# Patient Record
Sex: MALE | Race: WHITE | NOT HISPANIC OR LATINO | Employment: OTHER | URBAN - METROPOLITAN AREA
[De-identification: names, ages, dates, MRNs, and addresses within clinical notes are randomized per-mention and may not be internally consistent; named-entity substitution may affect disease eponyms.]

---

## 2020-11-20 PROBLEM — Z86.010 HISTORY OF COLON POLYPS: Status: ACTIVE | Noted: 2020-11-20

## 2020-11-20 PROBLEM — Z86.0100 HISTORY OF COLON POLYPS: Status: ACTIVE | Noted: 2020-11-20

## 2020-12-03 ENCOUNTER — TELEPHONE (OUTPATIENT)
Dept: ADMINISTRATIVE | Facility: CLINIC | Age: 70
End: 2020-12-03

## 2020-12-04 ENCOUNTER — TELEPHONE (OUTPATIENT)
Dept: ADMINISTRATIVE | Facility: CLINIC | Age: 70
End: 2020-12-04

## 2024-05-22 DIAGNOSIS — R06.02 SOB (SHORTNESS OF BREATH): Primary | ICD-10-CM

## 2024-05-23 DIAGNOSIS — R06.02 SHORTNESS OF BREATH: Primary | ICD-10-CM

## 2024-06-21 DIAGNOSIS — D63.1 ANEMIA IN CHRONIC KIDNEY DISEASE, UNSPECIFIED CKD STAGE: Primary | ICD-10-CM

## 2024-06-21 DIAGNOSIS — N18.9 ANEMIA IN CHRONIC KIDNEY DISEASE, UNSPECIFIED CKD STAGE: Primary | ICD-10-CM

## 2025-07-10 PROBLEM — E79.0 HYPERURICEMIA: Status: ACTIVE | Noted: 2025-07-10

## 2025-07-10 PROBLEM — R07.9 CHEST PAIN: Status: ACTIVE | Noted: 2025-07-10

## 2025-07-10 PROBLEM — E66.01 MORBID OBESITY: Status: ACTIVE | Noted: 2025-07-10

## 2025-07-10 PROBLEM — N18.30 CKD (CHRONIC KIDNEY DISEASE) STAGE 3, GFR 30-59 ML/MIN: Status: ACTIVE | Noted: 2025-07-10

## 2025-07-10 PROBLEM — I16.0 HYPERTENSIVE URGENCY: Status: ACTIVE | Noted: 2025-07-10

## 2025-07-10 PROBLEM — R79.89 ELEVATED BRAIN NATRIURETIC PEPTIDE (BNP) LEVEL: Status: ACTIVE | Noted: 2025-07-10

## 2025-07-10 PROBLEM — I16.1 HYPERTENSIVE EMERGENCY: Status: ACTIVE | Noted: 2025-07-10

## 2025-07-10 PROBLEM — I50.21 ACUTE SYSTOLIC (CONGESTIVE) HEART FAILURE: Status: ACTIVE | Noted: 2025-07-10

## 2025-07-10 PROBLEM — R79.89 ELEVATED TROPONIN: Status: ACTIVE | Noted: 2025-07-10

## 2025-07-10 PROBLEM — E11.9 DM (DIABETES MELLITUS): Status: ACTIVE | Noted: 2025-07-10

## 2025-07-10 PROBLEM — I50.9 ACUTE CHF: Status: ACTIVE | Noted: 2025-07-10

## 2025-07-10 PROBLEM — I50.9 NEW ONSET OF CONGESTIVE HEART FAILURE: Status: ACTIVE | Noted: 2025-07-10

## 2025-07-11 PROBLEM — I16.1 HYPERTENSIVE EMERGENCY: Status: RESOLVED | Noted: 2025-07-10 | Resolved: 2025-07-11

## 2025-07-11 PROBLEM — I10 HTN (HYPERTENSION): Status: ACTIVE | Noted: 2025-07-11

## 2025-07-11 PROBLEM — I16.0 HYPERTENSIVE URGENCY: Status: RESOLVED | Noted: 2025-07-10 | Resolved: 2025-07-11

## 2025-07-11 PROBLEM — R07.9 CHEST PAIN: Status: RESOLVED | Noted: 2025-07-10 | Resolved: 2025-07-11

## 2025-07-29 ENCOUNTER — OFFICE VISIT (OUTPATIENT)
Dept: CARDIOLOGY | Facility: CLINIC | Age: 75
End: 2025-07-29
Payer: MEDICARE

## 2025-07-29 VITALS
HEIGHT: 70 IN | OXYGEN SATURATION: 96 % | HEART RATE: 78 BPM | BODY MASS INDEX: 39.37 KG/M2 | SYSTOLIC BLOOD PRESSURE: 102 MMHG | WEIGHT: 275 LBS | DIASTOLIC BLOOD PRESSURE: 58 MMHG

## 2025-07-29 DIAGNOSIS — N18.30 STAGE 3 CHRONIC KIDNEY DISEASE, UNSPECIFIED WHETHER STAGE 3A OR 3B CKD: ICD-10-CM

## 2025-07-29 DIAGNOSIS — I50.9 CONGESTIVE HEART FAILURE, UNSPECIFIED HF CHRONICITY, UNSPECIFIED HEART FAILURE TYPE: ICD-10-CM

## 2025-07-29 DIAGNOSIS — R79.89 ELEVATED TROPONIN: ICD-10-CM

## 2025-07-29 DIAGNOSIS — E08.00 DIABETES MELLITUS DUE TO UNDERLYING CONDITION WITH HYPEROSMOLARITY WITHOUT COMA, WITHOUT LONG-TERM CURRENT USE OF INSULIN: ICD-10-CM

## 2025-07-29 DIAGNOSIS — R79.89 ELEVATED BRAIN NATRIURETIC PEPTIDE (BNP) LEVEL: ICD-10-CM

## 2025-07-29 DIAGNOSIS — I10 HYPERTENSION, UNSPECIFIED TYPE: Primary | ICD-10-CM

## 2025-07-29 DIAGNOSIS — E66.01 MORBID OBESITY: ICD-10-CM

## 2025-07-29 DIAGNOSIS — I16.0 HYPERTENSIVE URGENCY: ICD-10-CM

## 2025-07-29 PROCEDURE — 99999 PR PBB SHADOW E&M-EST. PATIENT-LVL III: CPT | Mod: PBBFAC,,,

## 2025-07-29 RX ORDER — ASPIRIN 81 MG/1
81 TABLET ORAL DAILY
Qty: 90 TABLET | Refills: 3 | Status: SHIPPED | OUTPATIENT
Start: 2025-07-29 | End: 2026-07-29

## 2025-07-29 RX ORDER — PRAVASTATIN SODIUM 10 MG/1
10 TABLET ORAL NIGHTLY
Qty: 90 TABLET | Refills: 3 | Status: SHIPPED | OUTPATIENT
Start: 2025-07-29

## 2025-07-29 RX ORDER — FUROSEMIDE 40 MG/1
40 TABLET ORAL DAILY PRN
Qty: 90 TABLET | Refills: 3 | Status: SHIPPED | OUTPATIENT
Start: 2025-07-29 | End: 2026-07-29

## 2025-07-29 RX ORDER — BENAZEPRIL HYDROCHLORIDE 10 MG/1
40 TABLET ORAL DAILY
Start: 2025-07-29

## 2025-07-29 RX ORDER — HYDROCHLOROTHIAZIDE 12.5 MG/1
12.5 TABLET ORAL DAILY
Qty: 90 TABLET | Refills: 3 | Status: SHIPPED | OUTPATIENT
Start: 2025-07-29 | End: 2026-07-29

## 2025-07-29 RX ORDER — METOPROLOL SUCCINATE 100 MG/1
100 TABLET, EXTENDED RELEASE ORAL DAILY
Qty: 90 TABLET | Refills: 3 | Status: SHIPPED | OUTPATIENT
Start: 2025-07-29

## 2025-07-29 RX ORDER — AMLODIPINE BESYLATE 10 MG/1
10 TABLET ORAL DAILY
Qty: 90 TABLET | Refills: 3 | Status: SHIPPED | OUTPATIENT
Start: 2025-07-29

## 2025-07-29 NOTE — PROGRESS NOTES
[facility] - Cardiology [address]  Cardiology Clinic Note      7/29/2025  11:19 AM    Problem list  Problem List[1]    History of Present Illness    CHIEF COMPLAINT:  Patient presents today for follow up after hospitalization with weakness and fatigue.    HISTORY OF PRESENT ILLNESS:  He was recently hospitalized with cardiac-related symptoms including significant dyspnea and chest pain. BP was notably elevated during admission and cardiac enzymes were slightly elevated, raising initial concerns about potential ischemic changes. Echo was normal. Since discharge, he reports feeling weak and tired, which may be related to BP medications. He experiences numbness predominantly on one side while lying in bed and describes being wobbly when walking. His condition appears to be improving since discharge and he denies current chest pain or acute dyspnea.    MEDICATIONS:  He is currently taking multiple BP medications including amlodipine, benazepril, and HCTZ. He also takes Lasix (furosemide) 40 mg daily as needed.    MEDICAL HISTORY:  He has a history of diabetes and HTN. He previously experienced LLE swelling and a brown spot on his LLE, both of which have resolved over the past year.    SOCIAL HISTORY:  His diet consists primarily of meat and potatoes. He does not eat vegetables, expressing a strong dislike for them, occasionally consuming peas over mashed potatoes and corn. He denies smoking history and alcohol use.      ROS:  General: -fever, -chills, +fatigue, -weight gain, -weight loss, +weakness  Eyes: -vision changes, -redness, -discharge  ENT: -ear pain, -nasal congestion, -sore throat  Cardiovascular: -chest pain, -palpitations, -lower extremity edema  Respiratory: -cough, -shortness of breath  Gastrointestinal: -abdominal pain, -nausea, -vomiting, -diarrhea, -constipation, -blood in stool  Genitourinary: -dysuria, -hematuria, -frequency  Musculoskeletal: -joint pain, -muscle pain  Skin: -rash,  -lesion  Neurological: -headache, +dizziness, +numbness, -tingling, +difficulty walking  Psychiatric: -anxiety, -depression, -sleep difficulty  Neck: +neck pain           Medications  Current Medications[2]   Prior to Admission medications    Medication Sig Start Date End Date Taking? Authorizing Provider   glipiZIDE (GLUCOTROL) 5 MG tablet Take 5 mg by mouth once daily. 7/29/21  Yes Provider, Historical   tamsulosin (FLOMAX) 0.4 mg Cap Take by mouth.   Yes Provider, Historical   traMADoL (ULTRAM-ER) 100 MG Tb24 Take by mouth.   Yes Provider, Historical   amLODIPine (NORVASC) 10 MG tablet   7/29/25 Yes Provider, Historical   aspirin (ECOTRIN) 81 MG EC tablet Take 1 tablet (81 mg total) by mouth once daily. 7/12/25 7/29/25 Yes Alfie Tadeo MD   furosemide (LASIX) 40 MG tablet Take 1 tablet (40 mg total) by mouth daily as needed (Shortness of breath or leg swelling). 7/12/25 7/29/25 Yes Alfie Tadeo MD   hydroCHLOROthiazide 12.5 MG Tab Take 1 tablet (12.5 mg total) by mouth once daily. 7/12/25 7/29/25 Yes Alfie Tadeo MD   metoprolol succinate (TOPROL-XL) 100 MG 24 hr tablet Take 100 mg by mouth once daily.  7/29/25 Yes Provider, Historical   pravastatin (PRAVACHOL) 10 MG tablet Take 10 mg by mouth every evening.  7/29/25 Yes Provider, Historical   albuterol (PROVENTIL/VENTOLIN HFA) 90 mcg/actuation inhaler USE 2 INHALATIONS BY MOUTH  EVERY 6 HOURS AS NEEDED FOR WHEEZING 6/15/21   Provider, Historical   amLODIPine (NORVASC) 10 MG tablet Take 1 tablet (10 mg total) by mouth once daily. 7/29/25   Reba Myers FNP-C   aspirin (ECOTRIN) 81 MG EC tablet Take 1 tablet (81 mg total) by mouth once daily. 7/29/25 7/29/26  Reba Myers FNP-C   benazepriL (LOTENSIN) 10 MG tablet Take 4 tablets (40 mg total) by mouth once daily. 7/29/25   Reba Myers FNP-C   furosemide (LASIX) 40 MG tablet Take 1 tablet (40 mg total) by mouth daily as needed (Shortness of breath or leg swelling). 7/29/25 7/29/26  Reba Myers,  FNP-MILES   hydroCHLOROthiazide 12.5 MG Tab Take 1 tablet (12.5 mg total) by mouth once daily. 7/29/25 7/29/26  Reba Myers FNP-MILES   metoprolol succinate (TOPROL-XL) 100 MG 24 hr tablet Take 1 tablet (100 mg total) by mouth once daily. 7/29/25   Reba Myers FNP-MILES   pravastatin (PRAVACHOL) 10 MG tablet Take 1 tablet (10 mg total) by mouth every evening. 7/29/25   Reba Myers FNP-MILES   benazepriL (LOTENSIN) 10 MG tablet Take 4 tablets (40 mg total) by mouth once daily. 7/11/25 7/29/25  Alfie Tadeo MD         History  Past Medical History:   Diagnosis Date    Anemia     Arthritis     COPD (chronic obstructive pulmonary disease)     Diabetes mellitus     History of colon polyps     Hypertension     Intention tremor     Lupus     Purpura     Renal disorder     Stage 3b per nephrology 04/2025.    Sleep apnea     cpap at day time- noon-2 and 4-6      Past Surgical History:   Procedure Laterality Date    cataracts Bilateral     COLONOSCOPY      x2    COLONOSCOPY  11/20/2020    colonoscopy per  11/20/2020    COLONOSCOPY N/A 11/20/2020    Procedure: COLONOSCOPY;  Surgeon: Gregg Yoo MD;  Location: [facility];  Service: Endoscopy;  Laterality: N/A;    PROSTATE BIOPSY      TONSILLECTOMY       Social History[3]      Allergies  Review of patient's allergies indicates:  No Known Allergies      Review of Systems   ROS      Physical Exam  Wt Readings from Last 1 Encounters:   07/29/25 124.7 kg (275 lb)     BP Readings from Last 3 Encounters:   07/29/25 (!) 102/58   07/11/25 (!) 179/76   04/17/24 (!) 157/67     Pulse Readings from Last 1 Encounters:   07/29/25 78     Body mass index is 39.46 kg/m².    Physical Exam  Constitutional:       Appearance: He is obese.   HENT:      Head: Normocephalic and atraumatic.      Mouth/Throat:      Mouth: Mucous membranes are moist.   Eyes:      Extraocular Movements: Extraocular movements intact.      Pupils: Pupils are equal, round, and reactive to light.   Neck:       Vascular: No carotid bruit.   Cardiovascular:      Heart sounds: No murmur heard.     No friction rub. No gallop.   Pulmonary:      Effort: Pulmonary effort is normal. No respiratory distress.   Abdominal:      General: Abdomen is flat.      Palpations: Abdomen is soft.   Musculoskeletal:      Right lower leg: No edema.      Left lower leg: No edema.   Skin:     General: Skin is warm.      Capillary Refill: Capillary refill takes less than 2 seconds.   Neurological:      General: No focal deficit present.      Mental Status: He is alert.   Psychiatric:         Mood and Affect: Mood normal.           Assessment & Plan    E08.00 Diabetes mellitus due to underlying condition with hyperosmolarity without coma, without long-term current use of insulin  E66.01 Morbid obesity  I50.9 Congestive heart failure, unspecified HF chronicity, unspecified heart failure type  N18.30 Stage III chronic kidney disease, unspecified whether stage 3a or 3b CKD  I16.0 Hypertensive urgency  I10 Hypertension, unspecified type  R79.89 Elevated troponin    IMPRESSION:  - Blood pressure low (102/58).  - Reduced blood pressure medications due to current low readings.  - Evaluated need for Lasix (furosemide) based on absence of edema and improved shortness of breath; decreased to take only as needed for signs of fluid retention, instead of daily.  - Recommend stress test to investigate potential cardiac ischemia; ordered pharmacological stress test (patient deferred at this time).    MORBID OBESITY:  Encouraged a heart healthy diet that is low in saturated fats and sodium   Also encouraged an increase in activities as tolerated for healthy weight management   Discussed Mediterranean Diet recommendations (Adopted from Adelfo et al, [facility], 2018.)  - Eat primarily plant-based foods, such as fruits/vegetables, whole grains, legumes & nuts  - Limit refined carbohydrates (white pasta, bread, rice).  - Replace butter with healthy fats such as olive  oil.  - Use herbs and spices instead of salt to flavor foods.  - Limit red meat and processed meats to no more than a few times a month.  - Avoid sugary sodas, bakery goods, and sweets.  - Eat fish and poultry at least twice a week.              - Get plenty of exercise (150 minutes per week).      CONGESTIVE HEART FAILURE, UNSPECIFIED HF CHRONICITY, UNSPECIFIED HEART FAILURE TYPE:  - Explained signs of fluid retention: shortness of breath at rest, inability to lay flat without propping up, swelling in feet/ankles/legs, sudden weight gain (2-3 lbs/day or 5 lbs/week).  - Discussed mechanism of blood pressure medications in removing excess fluid and potential for dehydration if not drinking enough water.  - Increase water intake, monitor for signs of fluid retention, weigh self daily in the morning after urinating with minimal clothing.  - Follow up sooner if deciding to proceed with stress test.    STAGE III CHRONIC KIDNEY DISEASE, UNSPECIFIED WHETHER STAGE 3A OR 3B CKD:  - Increase water intake.    HYPERTENSION, UNSPECIFIED TYPE:  - Discussed mechanism of blood pressure medications in removing excess fluid and potential for dehydration if not drinking enough water.  - Increase water intake, reduce consumption of soft drinks and high-sodium foods.  - Continued amlodipine: No changes mentioned.  - Continued benazepril: No changes mentioned.  - Continued HCTZ: No changes mentioned.  - Take Lasix as needed for fluid retention.  - Keep blood pressure log and follow up in 6 months.  - Contact office if any changes occur before next scheduled appointment.     ELEVATED TROPONIN:  Denies symptoms of chest pain and refuses stress test at this time             Brandi R. Carter, FNP-C [facility] - Cardiology.      Total professional time spent for the encounter: 40 minutes  Time was spent preparing to see the patient, reviewing results of prior testing, obtaining and/or reviewing separately obtained history, performing a  medically appropriate examination and interview, counseling and educating the patient/family, ordering medications/tests/procedures, referring and communicating with other health care professionals, documenting clinical information in the electronic health record, and independently interpreting results.    This note was generated with the assistance of ambient listening technology. Verbal consent was obtained by the patient and accompanying visitor(s) for the recording of patient appointment to facilitate this note. I attest to having reviewed and edited the generated note for accuracy, though some syntax or spelling errors may persist. Please contact the author of this note for any clarification.           [1]   Patient Active Problem List  Diagnosis    History of colon polyps    Acute systolic (congestive) heart failure    CKD (chronic kidney disease) stage 3, GFR 30-59 ml/min    DM (diabetes mellitus)    Hyperuricemia    Elevated troponin    New onset of congestive heart failure    Elevated brain natriuretic peptide (BNP) level    Morbid obesity    HTN (hypertension)   [2]   Current Outpatient Medications   Medication Sig Dispense Refill    glipiZIDE (GLUCOTROL) 5 MG tablet Take 5 mg by mouth once daily.      tamsulosin (FLOMAX) 0.4 mg Cap Take by mouth.      traMADoL (ULTRAM-ER) 100 MG Tb24 Take by mouth.      albuterol (PROVENTIL/VENTOLIN HFA) 90 mcg/actuation inhaler USE 2 INHALATIONS BY MOUTH  EVERY 6 HOURS AS NEEDED FOR WHEEZING      amLODIPine (NORVASC) 10 MG tablet Take 1 tablet (10 mg total) by mouth once daily. 90 tablet 3    aspirin (ECOTRIN) 81 MG EC tablet Take 1 tablet (81 mg total) by mouth once daily. 90 tablet 3    benazepriL (LOTENSIN) 10 MG tablet Take 4 tablets (40 mg total) by mouth once daily.      furosemide (LASIX) 40 MG tablet Take 1 tablet (40 mg total) by mouth daily as needed (Shortness of breath or leg swelling). 90 tablet 3    hydroCHLOROthiazide 12.5 MG Tab Take 1 tablet (12.5 mg  total) by mouth once daily. 90 tablet 3    metoprolol succinate (TOPROL-XL) 100 MG 24 hr tablet Take 1 tablet (100 mg total) by mouth once daily. 90 tablet 3    pravastatin (PRAVACHOL) 10 MG tablet Take 1 tablet (10 mg total) by mouth every evening. 90 tablet 3     No current facility-administered medications for this visit.     Facility-Administered Medications Ordered in Other Visits   Medication Dose Route Frequency Provider Last Rate Last Admin    lactated ringers infusion   Intravenous Continuous Gregg Yoo MD 75 mL/hr at 11/20/20 0828 New Bag at 11/20/20 0828    sodium chloride 0.9% flush 3 mL  3 mL Intravenous PRN Gregg Yoo MD       [3]   Social History  Socioeconomic History    Marital status:    Tobacco Use    Smoking status: Never    Smokeless tobacco: Never   Vaping Use    Vaping status: Never Used   Substance and Sexual Activity    Alcohol use: No    Drug use: No     Social Drivers of Health     Financial Resource Strain: High Risk (7/10/2025)    Overall Financial Resource Strain (CARDIA)     Difficulty of Paying Living Expenses: Very hard   Food Insecurity: No Food Insecurity (7/10/2025)    Hunger Vital Sign     Worried About Running Out of Food in the Last Year: Never true     Ran Out of Food in the Last Year: Never true   Transportation Needs: No Transportation Needs (7/10/2025)    PRAPARE - Transportation     Lack of Transportation (Medical): No     Lack of Transportation (Non-Medical): No   Physical Activity: Inactive (7/10/2025)    Exercise Vital Sign     Days of Exercise per Week: 0 days     Minutes of Exercise per Session: 0 min   Stress: Stress Concern Present (7/10/2025)    [employer] - Occupational Stress Questionnaire     Feeling of Stress : Very much   Housing Stability: Low Risk  (7/10/2025)    Housing Stability Vital Sign     Unable to Pay for Housing in the Last Year: No     Homeless in the Last Year: No